# Patient Record
Sex: MALE | URBAN - METROPOLITAN AREA
[De-identification: names, ages, dates, MRNs, and addresses within clinical notes are randomized per-mention and may not be internally consistent; named-entity substitution may affect disease eponyms.]

---

## 2019-09-06 ENCOUNTER — APPOINTMENT (RX ONLY)
Dept: URBAN - METROPOLITAN AREA CLINIC 17 | Facility: CLINIC | Age: 49
Setting detail: DERMATOLOGY
End: 2019-09-06

## 2019-09-06 DIAGNOSIS — B07.8 OTHER VIRAL WARTS: ICD-10-CM

## 2019-09-06 DIAGNOSIS — L20.89 OTHER ATOPIC DERMATITIS: ICD-10-CM

## 2019-09-06 DIAGNOSIS — D22 MELANOCYTIC NEVI: ICD-10-CM

## 2019-09-06 PROBLEM — L20.84 INTRINSIC (ALLERGIC) ECZEMA: Status: ACTIVE | Noted: 2019-09-06

## 2019-09-06 PROBLEM — D22.4 MELANOCYTIC NEVI OF SCALP AND NECK: Status: ACTIVE | Noted: 2019-09-06

## 2019-09-06 PROCEDURE — ? PRESCRIPTION

## 2019-09-06 PROCEDURE — ? COUNSELING

## 2019-09-06 PROCEDURE — ? OTHER

## 2019-09-06 PROCEDURE — 99202 OFFICE O/P NEW SF 15 MIN: CPT

## 2019-09-06 PROCEDURE — ? LIQUID NITROGEN

## 2019-09-06 RX ORDER — TRIAMCINOLONE ACETONIDE 1 MG/G
CREAM TOPICAL BID
Qty: 1 | Refills: 3 | Status: ERX | COMMUNITY
Start: 2019-09-06

## 2019-09-06 RX ADMIN — TRIAMCINOLONE ACETONIDE: 1 CREAM TOPICAL at 22:15

## 2019-09-06 ASSESSMENT — LOCATION SIMPLE DESCRIPTION DERM
LOCATION SIMPLE: RIGHT UPPER BACK
LOCATION SIMPLE: POSTERIOR SCALP
LOCATION SIMPLE: RIGHT FOREHEAD
LOCATION SIMPLE: RIGHT THUMB

## 2019-09-06 ASSESSMENT — LOCATION ZONE DERM
LOCATION ZONE: FACE
LOCATION ZONE: TRUNK
LOCATION ZONE: FINGER
LOCATION ZONE: SCALP

## 2019-09-06 ASSESSMENT — LOCATION DETAILED DESCRIPTION DERM
LOCATION DETAILED: RIGHT SUPERIOR MEDIAL FOREHEAD
LOCATION DETAILED: RIGHT MEDIAL UPPER BACK
LOCATION DETAILED: RIGHT PROXIMAL ULNAR THUMB
LOCATION DETAILED: LEFT SUPERIOR POSTAURICULAR SKIN

## 2019-09-06 NOTE — PROCEDURE: LIQUID NITROGEN
Post-Care Instructions: I reviewed with the patient in detail post-care instructions. Patient is to wear sunprotection, and avoid picking at any of the treated lesions. Pt may apply Vaseline to crusted or scabbing areas.
Add 52 Modifier (Optional): no
Medical Necessity Information: It is in your best interest to select a reason for this procedure from the list below. All of these items fulfill various CMS LCD requirements except the new and changing color options.
Duration Of Freeze Thaw-Cycle (Seconds): 5
Number Of Freeze-Thaw Cycles: 1 freeze-thaw cycle
Consent: The patient's consent was obtained including but not limited to risks of crusting, scabbing, blistering, scarring, darker or lighter pigmentary change, recurrence, incomplete removal and infection.
Medical Necessity Clause: This procedure was medically necessary because the lesions that were treated were:
Detail Level: Detailed
Include Z78.9 (Other Specified Conditions Influencing Health Status) As An Associated Diagnosis?: Yes

## 2019-09-06 NOTE — PROCEDURE: OTHER
Detail Level: Simple
Other (Free Text): Treated today with LN2 at no cost today.
Note Text (......Xxx Chief Complaint.): This diagnosis correlates with the
Other (Free Text): Quoted patient $250 dollars if want to have it removed.

## 2020-07-31 RX ORDER — TRIAMCINOLONE ACETONIDE 1 MG/G
1 CREAM TOPICAL BID
Qty: 1 | Refills: 0